# Patient Record
Sex: MALE | Race: OTHER | URBAN - METROPOLITAN AREA
[De-identification: names, ages, dates, MRNs, and addresses within clinical notes are randomized per-mention and may not be internally consistent; named-entity substitution may affect disease eponyms.]

---

## 2022-03-26 ENCOUNTER — INPATIENT (INPATIENT)
Facility: HOSPITAL | Age: 42
LOS: 2 days | Discharge: HOME | DRG: 897 | End: 2022-03-29
Attending: PSYCHIATRY & NEUROLOGY | Admitting: PSYCHIATRY & NEUROLOGY
Payer: MEDICAID

## 2022-03-26 VITALS
TEMPERATURE: 98 F | DIASTOLIC BLOOD PRESSURE: 61 MMHG | HEART RATE: 84 BPM | RESPIRATION RATE: 20 BRPM | OXYGEN SATURATION: 95 % | SYSTOLIC BLOOD PRESSURE: 127 MMHG

## 2022-03-26 DIAGNOSIS — F39 UNSPECIFIED MOOD [AFFECTIVE] DISORDER: ICD-10-CM

## 2022-03-26 DIAGNOSIS — R45.851 SUICIDAL IDEATIONS: ICD-10-CM

## 2022-03-26 DIAGNOSIS — F17.210 NICOTINE DEPENDENCE, CIGARETTES, UNCOMPLICATED: ICD-10-CM

## 2022-03-26 DIAGNOSIS — F25.9 SCHIZOAFFECTIVE DISORDER, UNSPECIFIED: ICD-10-CM

## 2022-03-26 DIAGNOSIS — G62.9 POLYNEUROPATHY, UNSPECIFIED: ICD-10-CM

## 2022-03-26 DIAGNOSIS — F14.14 COCAINE ABUSE WITH COCAINE-INDUCED MOOD DISORDER: ICD-10-CM

## 2022-03-26 DIAGNOSIS — F41.1 GENERALIZED ANXIETY DISORDER: ICD-10-CM

## 2022-03-26 DIAGNOSIS — Z59.00 HOMELESSNESS UNSPECIFIED: ICD-10-CM

## 2022-03-26 DIAGNOSIS — F14.929 COCAINE USE, UNSPECIFIED WITH INTOXICATION, UNSPECIFIED: ICD-10-CM

## 2022-03-26 DIAGNOSIS — Z91.51 PERSONAL HISTORY OF SUICIDAL BEHAVIOR: ICD-10-CM

## 2022-03-26 DIAGNOSIS — F12.20 CANNABIS DEPENDENCE, UNCOMPLICATED: ICD-10-CM

## 2022-03-26 DIAGNOSIS — B20 HUMAN IMMUNODEFICIENCY VIRUS [HIV] DISEASE: ICD-10-CM

## 2022-03-26 DIAGNOSIS — F43.23 ADJUSTMENT DISORDER WITH MIXED ANXIETY AND DEPRESSED MOOD: ICD-10-CM

## 2022-03-26 DIAGNOSIS — F14.129 COCAINE ABUSE WITH INTOXICATION, UNSPECIFIED: ICD-10-CM

## 2022-03-26 LAB
ALBUMIN SERPL ELPH-MCNC: 3 G/DL — LOW (ref 3.4–5)
ALP SERPL-CCNC: 62 U/L — SIGNIFICANT CHANGE UP (ref 40–120)
ALT FLD-CCNC: 21 U/L — SIGNIFICANT CHANGE UP (ref 12–42)
AMPHET UR-MCNC: NEGATIVE — SIGNIFICANT CHANGE UP
ANION GAP SERPL CALC-SCNC: 6 MMOL/L — LOW (ref 9–16)
AST SERPL-CCNC: 16 U/L — SIGNIFICANT CHANGE UP (ref 15–37)
BARBITURATES UR SCN-MCNC: NEGATIVE — SIGNIFICANT CHANGE UP
BASOPHILS # BLD AUTO: 0.04 K/UL — SIGNIFICANT CHANGE UP (ref 0–0.2)
BASOPHILS NFR BLD AUTO: 0.4 % — SIGNIFICANT CHANGE UP (ref 0–2)
BENZODIAZ UR-MCNC: NEGATIVE — SIGNIFICANT CHANGE UP
BILIRUB SERPL-MCNC: 0.3 MG/DL — SIGNIFICANT CHANGE UP (ref 0.2–1.2)
BUN SERPL-MCNC: 16 MG/DL — SIGNIFICANT CHANGE UP (ref 7–23)
CALCIUM SERPL-MCNC: 8.8 MG/DL — SIGNIFICANT CHANGE UP (ref 8.5–10.5)
CHLORIDE SERPL-SCNC: 104 MMOL/L — SIGNIFICANT CHANGE UP (ref 96–108)
CO2 SERPL-SCNC: 26 MMOL/L — SIGNIFICANT CHANGE UP (ref 22–31)
COCAINE METAB.OTHER UR-MCNC: POSITIVE
CREAT SERPL-MCNC: 0.86 MG/DL — SIGNIFICANT CHANGE UP (ref 0.5–1.3)
EGFR: 110 ML/MIN/1.73M2 — SIGNIFICANT CHANGE UP
EOSINOPHIL # BLD AUTO: 0.07 K/UL — SIGNIFICANT CHANGE UP (ref 0–0.5)
EOSINOPHIL NFR BLD AUTO: 0.6 % — SIGNIFICANT CHANGE UP (ref 0–6)
ETHANOL SERPL-MCNC: <3 MG/DL — SIGNIFICANT CHANGE UP
GLUCOSE SERPL-MCNC: 117 MG/DL — HIGH (ref 70–99)
HCT VFR BLD CALC: 40.4 % — SIGNIFICANT CHANGE UP (ref 39–50)
HGB BLD-MCNC: 13.7 G/DL — SIGNIFICANT CHANGE UP (ref 13–17)
IMM GRANULOCYTES NFR BLD AUTO: 0.4 % — SIGNIFICANT CHANGE UP (ref 0–1.5)
LYMPHOCYTES # BLD AUTO: 2.57 K/UL — SIGNIFICANT CHANGE UP (ref 1–3.3)
LYMPHOCYTES # BLD AUTO: 23.1 % — SIGNIFICANT CHANGE UP (ref 13–44)
MCHC RBC-ENTMCNC: 33.6 PG — SIGNIFICANT CHANGE UP (ref 27–34)
MCHC RBC-ENTMCNC: 33.9 GM/DL — SIGNIFICANT CHANGE UP (ref 32–36)
MCV RBC AUTO: 99 FL — SIGNIFICANT CHANGE UP (ref 80–100)
METHADONE UR-MCNC: NEGATIVE — SIGNIFICANT CHANGE UP
MONOCYTES # BLD AUTO: 0.72 K/UL — SIGNIFICANT CHANGE UP (ref 0–0.9)
MONOCYTES NFR BLD AUTO: 6.5 % — SIGNIFICANT CHANGE UP (ref 2–14)
NEUTROPHILS # BLD AUTO: 7.68 K/UL — HIGH (ref 1.8–7.4)
NEUTROPHILS NFR BLD AUTO: 69 % — SIGNIFICANT CHANGE UP (ref 43–77)
NRBC # BLD: 0 /100 WBCS — SIGNIFICANT CHANGE UP (ref 0–0)
OPIATES UR-MCNC: NEGATIVE — SIGNIFICANT CHANGE UP
PCP SPEC-MCNC: SIGNIFICANT CHANGE UP
PCP UR-MCNC: NEGATIVE — SIGNIFICANT CHANGE UP
PLATELET # BLD AUTO: 371 K/UL — SIGNIFICANT CHANGE UP (ref 150–400)
POTASSIUM SERPL-MCNC: 3.5 MMOL/L — SIGNIFICANT CHANGE UP (ref 3.5–5.3)
POTASSIUM SERPL-SCNC: 3.5 MMOL/L — SIGNIFICANT CHANGE UP (ref 3.5–5.3)
PROT SERPL-MCNC: 7.5 G/DL — SIGNIFICANT CHANGE UP (ref 6.4–8.2)
RBC # BLD: 4.08 M/UL — LOW (ref 4.2–5.8)
RBC # FLD: 13 % — SIGNIFICANT CHANGE UP (ref 10.3–14.5)
SARS-COV-2 RNA SPEC QL NAA+PROBE: SIGNIFICANT CHANGE UP
SODIUM SERPL-SCNC: 136 MMOL/L — SIGNIFICANT CHANGE UP (ref 132–145)
THC UR QL: POSITIVE
WBC # BLD: 11.12 K/UL — HIGH (ref 3.8–10.5)
WBC # FLD AUTO: 11.12 K/UL — HIGH (ref 3.8–10.5)

## 2022-03-26 PROCEDURE — 99234 HOSP IP/OBS SM DT SF/LOW 45: CPT

## 2022-03-26 PROCEDURE — 93010 ELECTROCARDIOGRAM REPORT: CPT

## 2022-03-26 RX ORDER — OLANZAPINE 15 MG/1
5 TABLET, FILM COATED ORAL EVERY 6 HOURS
Refills: 0 | Status: DISCONTINUED | OUTPATIENT
Start: 2022-03-27 | End: 2022-03-29

## 2022-03-26 RX ORDER — SERTRALINE 25 MG/1
50 TABLET, FILM COATED ORAL DAILY
Refills: 0 | Status: DISCONTINUED | OUTPATIENT
Start: 2022-03-27 | End: 2022-03-29

## 2022-03-26 RX ORDER — GABAPENTIN 400 MG/1
300 CAPSULE ORAL
Refills: 0 | Status: DISCONTINUED | OUTPATIENT
Start: 2022-03-27 | End: 2022-03-29

## 2022-03-26 RX ORDER — IBUPROFEN 200 MG
400 TABLET ORAL EVERY 8 HOURS
Refills: 0 | Status: DISCONTINUED | OUTPATIENT
Start: 2022-03-27 | End: 2022-03-29

## 2022-03-26 RX ORDER — OLANZAPINE 15 MG/1
5 TABLET, FILM COATED ORAL ONCE
Refills: 0 | Status: COMPLETED | OUTPATIENT
Start: 2022-03-26 | End: 2022-03-26

## 2022-03-26 RX ORDER — ARIPIPRAZOLE 15 MG/1
5 TABLET ORAL DAILY
Refills: 0 | Status: DISCONTINUED | OUTPATIENT
Start: 2022-03-27 | End: 2022-03-29

## 2022-03-26 RX ORDER — ACETAMINOPHEN 500 MG
650 TABLET ORAL ONCE
Refills: 0 | Status: COMPLETED | OUTPATIENT
Start: 2022-03-26 | End: 2022-03-26

## 2022-03-26 RX ORDER — BICTEGRAVIR SODIUM, EMTRICITABINE, AND TENOFOVIR ALAFENAMIDE FUMARATE 30; 120; 15 MG/1; MG/1; MG/1
1 TABLET ORAL DAILY
Refills: 0 | Status: DISCONTINUED | OUTPATIENT
Start: 2022-03-27 | End: 2022-03-29

## 2022-03-26 RX ADMIN — Medication 650 MILLIGRAM(S): at 11:38

## 2022-03-26 RX ADMIN — OLANZAPINE 5 MILLIGRAM(S): 15 TABLET, FILM COATED ORAL at 11:35

## 2022-03-26 RX ADMIN — OLANZAPINE 5 MILLIGRAM(S): 15 TABLET, FILM COATED ORAL at 19:58

## 2022-03-26 SDOH — ECONOMIC STABILITY - HOUSING INSECURITY: HOMELESSNESS UNSPECIFIED: Z59.00

## 2022-03-26 NOTE — ED ADULT NURSE NOTE - NSIMPLEMENTINTERV_GEN_ALL_ED
Implemented All Fall Risk Interventions:  [place] to call system. Call bell, personal items and telephone within reach. Instruct patient to call for assistance. Room bathroom lighting operational. Non-slip footwear when patient is off stretcher. Physically safe environment: no spills, clutter or unnecessary equipment. Stretcher in lowest position, wheels locked, appropriate side rails in place. Provide visual cue, wrist band, yellow gown, etc. Monitor gait and stability. Monitor for mental status changes and reorient to person, place, and time. Review medications for side effects contributing to fall risk. Reinforce activity limits and safety measures with patient and family.

## 2022-03-26 NOTE — ED BEHAVIORAL HEALTH NOTE - BEHAVIORAL HEALTH NOTE
REASSESSMENT:    Angel was given olanzapine 5mg IM for agitation at 11:35am and slept through the day. He was reassessed around 5:30pm. On subsequent exam, he presented with calmer affect and less hostility. He reported improvement in mood, though still had continued suicidal ideation, and stated that if he left he would kill himself. He reiterated that he had attempted to kill himself on the night of 3/25/22 with a razor to his wrist. He showed me a scar on his L wrist from a past suicide attempt by cutting from ~20y ago. He expressed desperation regarding the loss of his mother, his lack of social supports, his lack of access to care and general hopelessness about his life. He reported AH of voices telling him to kill himself. He reported he had used cocaine about 3 days ago. He expressed a desire to sign into the hospital.    More history collected: Angel identified his diagnosis as Schizoaffective Disorder, and he stated when he was symptomatic he experienced AH and depressed mood. He reported having been on Abilify, Zoloft, and Xanax 6mo ago but stopped when his mother . He added that he was taking biktarvy for HIV and gabapentin for neuropathy as well, but had stopped. He estimated he had been psychiatrically hospitalized 3-4x in his life, last time ~3y ago in [place]. He stated that since coming to [place], he had been street homeless.     ASSESSMENT/PLAN:    Angel is a 40yo man (homeless, recently moved to [facility] from [facility]) with reported PPHx of Bipolar/Schizoaffective DO (hx of psych admissions in [place], not currently in treatment x6mo, limited other details) and PMHx of HIV. He self-presented to ED reporting suicidal ideation, related to bereavement of his mother 6mo ago. He initially presented with labile mood and agitation, requiring IM medication. Impression was acute intoxication from cocaine. After being medicated and sleeping several hours, he was re-assessed. On subsequent exam, he continued to endorse suicidal ideation related to multiple life stressors, lack of social support and lack of access to care. Given the above factors and Angel's history of suicide attempt, he is at an acutely elevated risk and requires hospitalization for safety and stabilization. He agrees to sign in on  status.    - Psych: restart aripiprazole 5mg, sertraline 50mg; PRN olanzapine  - Medical: restart biktarvy, gabapentin for neuropathy  - Safety: routine checks, no need for 1:1  - Legal:  REASSESSMENT:    Angel was given olanzapine 5mg IM for agitation at 11:35am and slept through the day. He was reassessed around 5:30pm. On subsequent exam, he presented with calmer affect and less hostility. He reported improvement in mood, though still had continued suicidal ideation, and stated that if he left he would kill himself. He reiterated that he had attempted to kill himself on the night of 3/25/22 with a razor to his wrist. He showed me a scar on his L wrist from a past suicide attempt by cutting from ~20y ago. He expressed desperation regarding the loss of his mother, his lack of social supports, his lack of access to care and general hopelessness about his life. He reported AH of voices telling him to kill himself. He reported he had used cocaine about 3 days ago. He expressed a desire to sign into the hospital.    More history collected: Angel identified his diagnosis as Schizoaffective Disorder, and he stated when he was symptomatic he experienced AH and depressed mood. He reported having been on Abilify, Zoloft, and Xanax 6mo ago but stopped when his mother . He added that he was taking biktarvy for HIV and gabapentin for neuropathy as well, but had stopped. He estimated he had been psychiatrically hospitalized 3-4x in his life, last time ~3y ago in [place]. He stated that since coming to [place], he had been street homeless.     ASSESSMENT/PLAN:    Angel is a 42yo man (homeless, recently moved to [facility] from [facility]) with reported PPHx of Bipolar/Schizoaffective DO (hx of psych admissions in [place], not currently in treatment x6mo, limited other details) and PMHx of HIV. He self-presented to ED reporting suicidal ideation, related to bereavement of his mother 6mo ago. He initially presented with labile mood and agitation, requiring IM medication. Impression was acute intoxication from cocaine. After being medicated and sleeping several hours, he was re-assessed. On subsequent exam, he continued to endorse suicidal ideation related to multiple life stressors, lack of social support and lack of access to care. Given the above factors and Angel's history of suicide attempt, he is at an acutely elevated risk and requires hospitalization for safety and stabilization. He agrees to sign in on  status.    - Psych: restart aripiprazole 5mg, sertraline 50mg; PRN olanzapine  - Medical: restart biktarvy, gabapentin for neuropathy  - Safety: routine checks, no need for 1:1  - Legal:     COVID Exposure Screen- Patient    1. *Have you had a COVID-19 test in the last 90 days? ( ) Yes (x) No ( ) Unknown- Reason: _____  IF YES PROCEED TO QUESTION #2. IF NO OR UNKNOWN, PLEASE SKIP TO QUESTION #3.  2. Date of test(s) and result(s): ________  3. *Have you tested positive for COVID-19 antibodies? ( ) Yes (x) No ( ) Unknown- Reason: _____  IF YES PROCEED TO QUESTION #4. IF NO or UNKNOWN, PLEASE SKIP TO QUESTION #5.  4. Date of positive antibody test: ________  5. *Have you received 2 doses of the COVID-19 vaccine? (x) Yes ( ) No ( ) Unknown- Reason: _____  IF YES PROCEED TO QUESTION #6. IF NO or UNKNOWN, PLEASE SKIP TO QUESTION #7.  6. Date of second dose: "months ago"  7. *In the past 10 days, have you been around anyone with a positive COVID-19 test?* ( ) Yes (x) No ( ) Unknown- Reason: ____  IF YES PROCEED TO QUESTION #8. IF NO or UNKNOWN, PLEASE SKIP TO QUESTION #13.  8. Were you within 6 feet of them for at least 15 minutes? ( ) Yes ( ) No ( ) Unknown- Reason: _____  9. Have you provided care for them? ( ) Yes ( ) No ( ) Unknown- Reason: ______  10. Have you had direct physical contact with them (touched, hugged, or kissed them)? ( ) Yes ( ) No ( ) Unknown- Reason: _____  11. Have you shared eating or drinking utensils with them? ( ) Yes ( ) No ( ) Unknown- Reason: ____  12. Have they sneezed, coughed, or somehow gotten respiratory droplets on you? ( ) Yes ( ) No ( ) Unknown- Reason: ______  13. *Have you been out of [place] within the past 10 days?* ( ) Yes (x) No ( ) Unknown- Reason: _____  IF YES PLEASE ANSWER THE FOLLOWING QUESTIONS:  14. Which state/country have you been to? ______  15. Were you there over 24 hours? ( ) Yes ( ) No ( ) Unknown- Reason: ______  16. Date of return to [facility]: ______

## 2022-03-26 NOTE — ED BEHAVIORAL HEALTH ASSESSMENT NOTE - DETAILS
Spoke to Dr Cancino Described standing with razor to wrist on 3/25/22, with suicidal intent Self-referred

## 2022-03-26 NOTE — ED BEHAVIORAL HEALTH ASSESSMENT NOTE - SUMMARY
Angel is a 42yo man (homeless, recently moved to [facility] from [facility]) with reported PPHx of Bipolar/Schizoaffective DO (hx of psych admissions in [place], not currently in treatment x6mo, limited other details) and PMHx of HIV. He self-presented to ED reporting suicidal ideation, related to bereavement of his mother 6mo ago.    Angel was interviewed via telepsych by myself and PA student Ulises Blair. On 1:1. He was agitated on encounter, with labile affect and general hostility towards team. He appeared to have freshly dyed his hair pink, and residue was visible on his hands/sheets of ED bed. He initially stated he was in the ED because "I'm having suicidal thoughts" and described how last night he had stood on a rooftop with a razor against his wrist, wanting to cut himself. He was not sure what stopped him, but he then came to the ED to seek help. He reported he was previously in treatment (trials of Buspar, Prozac, Xanax) but stopped 6 months ago after his mother . He reported his mother was his main support and helped him stay in psychiatric treatment. 5 minutes into the interview, he became dysregulated and screamed at Ms Johnnie - "Are you going to help me or not??? That's it, I'm leaving, no one is helping me." He was not verbally redirectable via telepsych cart. He stated he was no longer suicidal, but then said "I'm going to leave here and go to sleep" without elaborating.    Case discussed with ED team - presentation erratic, consistent with cocaine or other substance intoxication vs carrie. Unable to meaningfully safety plan or clarify symptoms to dispo at this time. Mr Vasquez will receive olanzapine 5mg IM and be placed on OBS status for reassessment later in the day.

## 2022-03-26 NOTE — ED ADULT NURSE REASSESSMENT NOTE - NS ED NURSE REASSESS COMMENT FT1
Pt received from Wilber RN. Pt resting comfortably in bed. No s/s of acute distress. Will continue to monitor

## 2022-03-26 NOTE — ED BEHAVIORAL HEALTH ASSESSMENT NOTE - MEDICAL ISSUES AND PLAN (INCLUDE STANDING AND PRN MEDICATION)
Refill requests received for  tiotropium (SPIRIVA HANDIHALER) 18 MCG capsule for inhaler 30 capsule 11 6/11/2018     Sig - Route: Place 1 capsule into inhaler and inhale daily. - Inhalation      fluticasone-vilanterol (BREO ELLIPTA) 200-25 MCG/INH inhaler 60 each 11 6/11/2018     Sig - Route: Inhale 1 puff into the lungs once daily. - Inhalation      Last office visit 5/22/19 and patient to continue spiriva and breo ellipta for COPD.  Future office visit not scheduled    Refills sent per MD standing orders.  
None

## 2022-03-26 NOTE — ED BEHAVIORAL HEALTH ASSESSMENT NOTE - HPI (INCLUDE ILLNESS QUALITY, SEVERITY, DURATION, TIMING, CONTEXT, MODIFYING FACTORS, ASSOCIATED SIGNS AND SYMPTOMS)
Angel is a 42yo man (homeless, recently moved to [facility] from [facility]) with reported PPHx of Bipolar/Schizoaffective DO (hx of psych admissions in [place], not currently in treatment x6mo, limited other details) and PMHx of HIV. He self-presented to ED reporting suicidal ideation, related to bereavement of his mother 6mo ago.    Angel was interviewed via telepsych by myself and PA student Ulises Blair. On 1:1. He was agitated on encounter, with labile affect and general hostility towards team. He appeared to have freshly dyed his hair pink, and residue was visible on his hands/sheets of ED bed. He initially stated he was in the ED because "I'm having suicidal thoughts" and described how last night he had stood on a rooftop with a razor against his wrist, wanting to cut himself. He was not sure what stopped him, but he then came to the ED to seek help. He reported he was previously in treatment (trials of Buspar, Prozac, Xanax) but stopped 6 months ago after his mother . He reported his mother was his main support and helped him stay in psychiatric treatment. 5 minutes into the interview, he became dysregulated and screamed at Ms Johnnie - "Are you going to help me or not??? That's it, I'm leaving, no one is helping me." He was not verbally redirectable via telepsych cart. He stated he was no longer suicidal, but then said "I'm going to leave here and go to sleep" without elaborating.    Case discussed with ED team - presentation erratic, consistent with cocaine or other substance intoxication vs carrie. Unable to meaningfully safety plan or clarify symptoms to dispo at this time. Mr Vasquez will receive olanzapine 5mg IM and be placed on OBS status for reassessment later in the day.

## 2022-03-26 NOTE — ED BEHAVIORAL HEALTH ASSESSMENT NOTE - DESCRIPTION
Initially calm but agitated once interviewed by telepsych, given Zyprexa IM    Vital Signs - Last 24 Hrs    T(C): 36.4 (03-26-22 @ 09:22), Max: 36.6 (03-26-22 @ 05:45)  HR: 78 (03-26-22 @ 09:22) (78 - 84)  BP: 136/87 (03-26-22 @ 09:22) (127/61 - 138/89)  RR: 18 (03-26-22 @ 09:22) (18 - 20)  SpO2: 98% (03-26-22 @ 09:22) (95% - 98%)  Wt(kg): --  Daily     Daily     I&O's Summary HIV Currently homeless, apparently recently moved to [facility] from [facility] 6months ago after mother

## 2022-03-26 NOTE — ED PROVIDER NOTE - CLINICAL SUMMARY MEDICAL DECISION MAKING FREE TEXT BOX
suicidal ideation, called psych dr arellano, labs OK, etoh neg, will be evaluated by AM psyc team. Will sign out to AM to call for consult.

## 2022-03-26 NOTE — ED BEHAVIORAL HEALTH ASSESSMENT NOTE - NSPRESENTSXS_PSY_ALL_CORE
Depressed mood/Anhedonia/Impulsivity/Hopelessness or despair/Refusal or inability to complete safety plan

## 2022-03-26 NOTE — ED BEHAVIORAL HEALTH NOTE - BEHAVIORAL HEALTH NOTE
Telepsychiatry Note:    MD received handoff on patient. Patient seen via [employer] cart. Patient presents somewhat drowsy, depressed. Has suicidal ideation. He remains in agreement with voluntary admission to [facility].     - transfer to [facility] on 9.13 status for inpatient psychiatric mgmt.

## 2022-03-26 NOTE — ED ADULT NURSE REASSESSMENT NOTE - NS ED NURSE REASSESS COMMENT FT1
Pt sleeping comfortably in bed. No s/s of acute distress. Will continue to monitor. 1:1 constant observation maintained

## 2022-03-26 NOTE — ED ADULT TRIAGE NOTE - CHIEF COMPLAINT QUOTE
Pt reports SI w/ thoughts of cutting self, endorses hearing their mother tell them to "join her", pt states their mother is dead, also reports seeing "black shadows". Denies HI. Pt endorses hx of bipolar schizophrenia, anxiety and depression. Pt states they have been "living on the streets" and have not had any of their medication x6 months.

## 2022-03-26 NOTE — ED PROVIDER NOTE - OBJECTIVE STATEMENT
40 yo male with hx bipolar d/o schizoaffective, HIV, stopped all his meds approx 6 months ago when he moved from [place] to [facility]. Currently homeless for approx 6 months since his mom . Came to ED stating he has no good reason to live anymore, is depressed ad stopped taking his medications due to depression, he has increasing frequency of suicidal thoughts, has been hospitalized in [place] for psych treatment.

## 2022-03-26 NOTE — ED ADULT NURSE NOTE - OBJECTIVE STATEMENT
Pt reports SI w/ thoughts of cutting self, pt presents with red hair dye in hair and all over hands, states this was her mothers hair color and shes been hearing to  "join her", pt states their mother is dead, also reports seeing "black shadows". Denies HI. Pt endorses hx of bipolar schizophrenia, anxiety and depression. Pt states they have been "living on the streets" and have not had any of their medication x6 months.

## 2022-03-26 NOTE — ED ADULT NURSE NOTE - ACTIVATING EVENTS/STRESSORS
Pending incarceration or homelessness/Non-compliant or not receiving treatment/Inadequate social supports

## 2022-03-26 NOTE — ED ADULT NURSE REASSESSMENT NOTE - NS ED NURSE REASSESS COMMENT FT1
Transfer of care acknowledged with bedside rounding, lab results reviewed, will continue to monitor.  pt remains on 1:1 for SI. in nad at this time, sleeping comfortably

## 2022-03-27 DIAGNOSIS — F14.10 COCAINE ABUSE, UNCOMPLICATED: ICD-10-CM

## 2022-03-27 DIAGNOSIS — F12.20 CANNABIS DEPENDENCE, UNCOMPLICATED: ICD-10-CM

## 2022-03-27 RX ADMIN — Medication 400 MILLIGRAM(S): at 10:34

## 2022-03-27 RX ADMIN — SERTRALINE 50 MILLIGRAM(S): 25 TABLET, FILM COATED ORAL at 10:34

## 2022-03-27 RX ADMIN — Medication 400 MILLIGRAM(S): at 11:30

## 2022-03-27 RX ADMIN — BICTEGRAVIR SODIUM, EMTRICITABINE, AND TENOFOVIR ALAFENAMIDE FUMARATE 1 TABLET(S): 30; 120; 15 TABLET ORAL at 10:34

## 2022-03-27 RX ADMIN — GABAPENTIN 300 MILLIGRAM(S): 400 CAPSULE ORAL at 10:34

## 2022-03-27 RX ADMIN — OLANZAPINE 5 MILLIGRAM(S): 15 TABLET, FILM COATED ORAL at 16:06

## 2022-03-27 RX ADMIN — ARIPIPRAZOLE 5 MILLIGRAM(S): 15 TABLET ORAL at 10:33

## 2022-03-27 RX ADMIN — GABAPENTIN 300 MILLIGRAM(S): 400 CAPSULE ORAL at 22:13

## 2022-03-27 NOTE — BH INPATIENT PSYCHIATRY ASSESSMENT NOTE - NSBHMETABOLIC_PSY_ALL_CORE_FT
BMI: BMI (kg/m2): 19.3 (03-27-22 @ 01:48)  HbA1c:   Glucose:   BP: 137/89 (03-27-22 @ 09:09) (127/61 - 143/79)  Lipid Panel:

## 2022-03-27 NOTE — BH INPATIENT PSYCHIATRY ASSESSMENT NOTE - NSCOMMENTSUICRISKFACT_PSY_ALL_CORE
The patient has a chronic risk of suicide/self-harm given a history of suicide attempts, history of mood disorder and psychosis, ongoing substance use, male gender, history of trauma, and recent acute stressor (loss of mother).

## 2022-03-27 NOTE — BH INPATIENT PSYCHIATRY ASSESSMENT NOTE - NSBHCHARTREVIEWLAB_PSY_A_CORE FT
CBC (03/26/22): WBC elevated 11.12, RBC low at 4.08, Neutrophil elevated to 7.68  CMP (03/26/22): Serum Glucose elevated 117, Serum Albumin low at 3.0, Serum Anion Gap low at 6  UTox (03/26/22): Positive for THC and Cocaine  COVID-19 PCR: Not detected

## 2022-03-27 NOTE — ED BEHAVIORAL HEALTH NOTE - BEHAVIORAL HEALTH NOTE
Patient interviewed and part B completed at [facility]. Orders have been entered and handoff provided to inpatient RN  and Inpatient MD will receive handoff in AM. Full ED BH Assessment is located in sunrise.

## 2022-03-27 NOTE — BH INPATIENT PSYCHIATRY ASSESSMENT NOTE - CURRENT MEDICATION
MEDICATIONS  (STANDING):  ARIPiprazole 5 milliGRAM(s) Oral daily  bictegravir 50 mG/emtricitabine 200 mG/tenofovir alafenamide 25 mG (BIKTARVY) 1 Tablet(s) Oral daily  gabapentin 300 milliGRAM(s) Oral two times a day  sertraline 50 milliGRAM(s) Oral daily    MEDICATIONS  (PRN):  ibuprofen  Tablet. 400 milliGRAM(s) Oral every 8 hours PRN Moderate Pain (4 - 6)  OLANZapine 5 milliGRAM(s) Oral every 6 hours PRN agitation

## 2022-03-27 NOTE — BH INPATIENT PSYCHIATRY ASSESSMENT NOTE - DESCRIPTION (FIRST USE, LAST USE, QUANTITY, FREQUENCY, DURATION)
The patient first smoked tobacco at the age of 6 years old.  The patient states that he smokes "occasionally" and that his last cigarette was a "few days ago."
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
## 2022-03-27 NOTE — BH INPATIENT PSYCHIATRY ASSESSMENT NOTE - NSBHCHARTREVIEWVS_PSY_A_CORE FT
DISPLAY PLAN FREE TEXT Vital Signs Last 24 Hrs  T(C): 37.1 (03-27-22 @ 09:09), Max: 37.1 (03-27-22 @ 09:09)  T(F): 98.8 (03-27-22 @ 09:09), Max: 98.8 (03-27-22 @ 09:09)  HR: 93 (03-27-22 @ 09:09) (77 - 94)  BP: 137/89 (03-27-22 @ 09:09) (132/93 - 143/79)  BP(mean): --  RR: 18 (03-27-22 @ 09:09) (16 - 18)  SpO2: 99% (03-27-22 @ 09:09) (97% - 99%)

## 2022-03-27 NOTE — BH PATIENT CHARACTERISTICS SURVEY - NSPCSHEALTHINS4_PSY_ALL_CORE
-- Message is from the [facility]--    Order Request  Lab: blood work    Message / reason: needs order for blood work and does the patient have to fast for the test ?    Insurance type: hmo  No billing information found for this encounter.           Preferred Delivery Method   Call when ready for pickup - phone number to notify: [phone]     Caller Information       Type Contact Phone    10/07/2019 03:54 PM Phone (Incoming) Victoriano Key (Self) [phone] (H)          Alternative phone number: none    Turnaround time given to caller:   \"This message will be sent to [state Provider's name]. The clinical team will fulfill your request as soon as they review your message when the office opens tomorrow.\"  
Lab has been ordered-He must be fasting  
Last labs 10-5-2018  
Notified pt by vociemail  
No
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
## 2022-03-27 NOTE — BH INPATIENT PSYCHIATRY ASSESSMENT NOTE - NSICDXBHSECONDARYDX_PSY_ALL_CORE
Schizoaffective disorder   F25.9  Cocaine intoxication   F14.929  Moderate cannabis use disorder   F12.20  Cocaine use disorder   F14.10

## 2022-03-27 NOTE — BH INPATIENT PSYCHIATRY ASSESSMENT NOTE - NSDCCRITERIA_PSY_ALL_CORE
Improvement in emotional regulation, resolution of SI, and improvement in anxious and depressive symptoms.  Establishment of outpatient psychiatric follow-up.

## 2022-03-27 NOTE — BH INPATIENT PSYCHIATRY ASSESSMENT NOTE - NSBHCONSBHPROVCNTCTNOFT_PSY_A_CORE
The patient is not currently in outpatient psychiatric treatment and he does not remember the name or contact information of his last psychiatrist.

## 2022-03-27 NOTE — BH PATIENT PROFILE - FALL HARM RISK - UNIVERSAL INTERVENTIONS
Bed in lowest position, wheels locked, appropriate side rails in place/Call bell, personal items and telephone in reach/Instruct patient to call for assistance before getting out of bed or chair/Non-slip footwear when patient is out of bed/Malcolm to call system/Physically safe environment - no spills, clutter or unnecessary equipment/Purposeful Proactive Rounding/Room/bathroom lighting operational, light cord in reach

## 2022-03-27 NOTE — BH INPATIENT PSYCHIATRY ASSESSMENT NOTE - NSCOMMENTSUICPROTRISKFACT_PSY_ALL_CORE
Protective factors include a capacity to advocate for self, future-motivated, willing to engage in treatment, euthymic affect and currently denying SI.

## 2022-03-27 NOTE — BH INPATIENT PSYCHIATRY ASSESSMENT NOTE - RISK ASSESSMENT
Mod: Presenting symptoms, lack of access to care  Unmod: Homelessness, drug use, SMI, limited social support  Protective: May not be suicidal    Elevated risk by history and current presentation, will hold in ED to reassess
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
## 2022-03-28 PROCEDURE — 99233 SBSQ HOSP IP/OBS HIGH 50: CPT

## 2022-03-28 RX ORDER — LIDOCAINE 4 G/100G
15 CREAM TOPICAL THREE TIMES A DAY
Refills: 0 | Status: DISCONTINUED | OUTPATIENT
Start: 2022-03-28 | End: 2022-03-29

## 2022-03-28 RX ADMIN — Medication 400 MILLIGRAM(S): at 10:41

## 2022-03-28 RX ADMIN — SERTRALINE 50 MILLIGRAM(S): 25 TABLET, FILM COATED ORAL at 10:42

## 2022-03-28 RX ADMIN — GABAPENTIN 300 MILLIGRAM(S): 400 CAPSULE ORAL at 10:42

## 2022-03-28 RX ADMIN — BICTEGRAVIR SODIUM, EMTRICITABINE, AND TENOFOVIR ALAFENAMIDE FUMARATE 1 TABLET(S): 30; 120; 15 TABLET ORAL at 10:43

## 2022-03-28 RX ADMIN — Medication 400 MILLIGRAM(S): at 19:27

## 2022-03-28 RX ADMIN — ARIPIPRAZOLE 5 MILLIGRAM(S): 15 TABLET ORAL at 10:42

## 2022-03-28 RX ADMIN — Medication 400 MILLIGRAM(S): at 12:42

## 2022-03-28 RX ADMIN — Medication 400 MILLIGRAM(S): at 19:47

## 2022-03-28 RX ADMIN — OLANZAPINE 5 MILLIGRAM(S): 15 TABLET, FILM COATED ORAL at 17:34
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
## 2022-03-28 NOTE — BH SOCIAL WORK INITIAL PSYCHOSOCIAL EVALUATION - NSBHHOUSE_PSY_ALL_CORE
Per chart review, the patient states that she has been "living on the streets for the past six months."/Shelter/Sober house Per chart review, the patient states that she has been "living on the streets for the past six months."/Home alone

## 2022-03-28 NOTE — BH INPATIENT PSYCHIATRY PROGRESS NOTE - CASE SUMMARY
Pt already requesting discharge. No suicidality at this time and downplaying symptoms. Presentation fits well with cocaine induced mood/psychotic disorder. Pt says she is returning to [place]. Feeling better on abilify. Said she would be open to MÉNDEZ abilify but unclear how much she really needs that. Likely discharge tomorrow.

## 2022-03-28 NOTE — BH INPATIENT PSYCHIATRY PROGRESS NOTE - NSTXCOPEINTERMD_PSY_ALL_CORE
Medication management 15 min x day; continue to develop therapeutic relationship and to encourage adherence with gabapentin, Zoloft, Abilify; consider Maintena before discharge

## 2022-03-28 NOTE — BH SOCIAL WORK INITIAL PSYCHOSOCIAL EVALUATION - SAFE PLACE TO LIVE - DETAILS
Per chart review, the patient states that she has been "living on the streets for the past six months."

## 2022-03-28 NOTE — BH SOCIAL WORK INITIAL PSYCHOSOCIAL EVALUATION - NSBHFINANCECOPAY_PSY_ALL_CORE
Per chart, the patient is listed as self-pay. [facility] [employer] has been notified./Unable to answer (specify) Per chart, the patient is listed as self-pay. [facility] [employer] has been notified./Unknown
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
## 2022-03-28 NOTE — BH INPATIENT PSYCHIATRY PROGRESS NOTE - NSBHFUPINTERVALHXFT_PSY_A_CORE
Pt seen in her room this morning with the team. She stated that she felt much better apart from headache and toothache (has a broken tooth). Pt stated that she suffers from migraines on occasion. She noted that her symptoms were much reduced today and that she would like to return home, first to her aunt's place here in [place], then back to [place]. She denied SI entirely, as well as AH or other psychotic symptoms. The patient stated that this had been the first time she ever tried cocaine, and that it made her feel much worse, so she would not want to do it again. She also said that her medications -- Abilify, Zoloft, gabapentin -- were what she had "always been on" and that they were helping her feel stable.  The patient credited scars on her forearms to "stress cutting" when she was younger and denied any suicide attempts in the past (other than the one that brought her here). She denied ever having self-harmed to the point where she needed serious medical attention. Denied use of other substances or of nicotine. 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 She noted a allergy to fish. Denied history of trauma (despite notes in chart).    Regarding discharge, the patient was advised that the team had just met her and would need at least a short while to get to know her and her situation/level of acuity before discharge, adding that follow-up appointments would have to be made. She appeared fine with this and noted that she would accept a follow-up appointment with psychiatry here in [place]. She also noted that psychotherapy had been helpful ("just generally") in the past and that she would not mind more of it.    The patient would be okay with discharge either with PO meds or on Maintena.

## 2022-03-28 NOTE — BH SOCIAL WORK INITIAL PSYCHOSOCIAL EVALUATION - NSBHTREATHX_PSY_ALL_CORE
Per chart review, not currently in outpatient psychiatric treatment (last in treatment approximately six months ago)./Unable to answer (specify)

## 2022-03-28 NOTE — BH SOCIAL WORK INITIAL PSYCHOSOCIAL EVALUATION - NSBHHOUSECOMMENTFT_PSY_ALL_CORE
The patient reports she will reside with her "aunt" at [address] [place] [address] upon discharge. Her aunt is named Bernadette Mcneill, but she did not know her aunt's phone number.

## 2022-03-28 NOTE — BH INPATIENT PSYCHIATRY PROGRESS NOTE - CURRENT MEDICATION
MEDICATIONS  (STANDING):  ARIPiprazole 5 milliGRAM(s) Oral daily  bictegravir 50 mG/emtricitabine 200 mG/tenofovir alafenamide 25 mG (BIKTARVY) 1 Tablet(s) Oral daily  gabapentin 300 milliGRAM(s) Oral two times a day  sertraline 50 milliGRAM(s) Oral daily    MEDICATIONS  (PRN):  ibuprofen  Tablet. 400 milliGRAM(s) Oral every 8 hours PRN Moderate Pain (4 - 6)  OLANZapine 5 milliGRAM(s) Oral every 6 hours PRN agitation   MEDICATIONS  (STANDING):    MEDICATIONS  (PRN):

## 2022-03-28 NOTE — BH INPATIENT PSYCHIATRY PROGRESS NOTE - NSBHMETABOLIC_PSY_ALL_CORE_FT
BMI: BMI (kg/m2): 19.3 (03-27-22 @ 01:48)  HbA1c:   Glucose:   BP: 135/91 (03-28-22 @ 08:45) (127/61 - 162/91)  Lipid Panel:  BMI: BMI (kg/m2): 19.3 (03-27-22 @ 01:48)  HbA1c: A1C with Estimated Average Glucose Result: 4.8 % (03-29-22 @ 07:55)    Glucose:   BP: 132/89 (03-29-22 @ 08:30) (132/89 - 162/91)  Lipid Panel: Date/Time: 03-29-22 @ 07:55  Cholesterol, Serum: 135  Direct LDL: --  HDL Cholesterol, Serum: 41  Total Cholesterol/HDL Ration Measurement: --  Triglycerides, Serum: 57

## 2022-03-28 NOTE — BH INPATIENT PSYCHIATRY PROGRESS NOTE - PRN MEDS
MEDICATIONS  (PRN):  ibuprofen  Tablet. 400 milliGRAM(s) Oral every 8 hours PRN Moderate Pain (4 - 6)  OLANZapine 5 milliGRAM(s) Oral every 6 hours PRN agitation   MEDICATIONS  (PRN):

## 2022-03-28 NOTE — BH SOCIAL WORK INITIAL PSYCHOSOCIAL EVALUATION - NSPROGENSOURCEINFO_PSY_ALL_CORE
The patient stated she had a headache and asked if this writer could stop by at a later time. Thus, this assessment is completed via mainly chart review until the patient is available to speak in further depth./Patient/Transcribed from patient self report Patient/Transcribed from patient self report

## 2022-03-28 NOTE — BH INPATIENT PSYCHIATRY PROGRESS NOTE - NSBHCHARTREVIEWVS_PSY_A_CORE FT
Vital Signs Last 24 Hrs  T(C): 36.7 (03-28-22 @ 08:45), Max: 37.1 (03-27-22 @ 21:00)  T(F): 98.1 (03-28-22 @ 08:45), Max: 98.8 (03-27-22 @ 21:00)  HR: 100 (03-28-22 @ 08:45) (86 - 100)  BP: 135/91 (03-28-22 @ 08:45) (135/91 - 162/91)  BP(mean): --  RR: 18 (03-28-22 @ 08:45) (18 - 18)  SpO2: 97% (03-28-22 @ 08:45) (96% - 97%)     Vital Signs Last 24 Hrs  T(C): --  T(F): --  HR: --  BP: --  BP(mean): --  RR: --  SpO2: --

## 2022-03-28 NOTE — BH SOCIAL WORK INITIAL PSYCHOSOCIAL EVALUATION - OTHER PAST PSYCHIATRIC HISTORY (INCLUDE DETAILS REGARDING ONSET, COURSE OF ILLNESS, INPATIENT/OUTPATIENT TREATMENT)
Per chart review, PPH reported Bipolar vs Schizoaffective Disorder, history of prior inpatient psychiatric admissions in [place] most recently three years ago, not currently in outpatient psychiatric treatment (last in treatment approximately six months ago).

## 2022-03-28 NOTE — BH SOCIAL WORK INITIAL PSYCHOSOCIAL EVALUATION - NSBHFINANCEBENEFIT_PSY_ALL_CORE
Per chart, the patient is listed as self-pay. [facility] [employer] has been notified./Unable to answer (specify) Per chart, the patient is listed as self-pay. [facility] [employer] has been notified./Unknown

## 2022-03-28 NOTE — BH SOCIAL WORK INITIAL PSYCHOSOCIAL EVALUATION - NSBHFINANCE_PSY_ALL_CORE
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 However, per chart review, patient reported that she is currently unemployed and that her mother supported her financially until her death in September 2021./Earned income
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
## 2022-03-28 NOTE — BH TREATMENT PLAN - NSPTSTATEDGOAL_PSY_ALL_CORE
The patient stated she had a headache and asked if this writer could stop by at a later time. Thus, this assessment is completed via mainly chart review until the patient is available to speak in further depth.

## 2022-03-28 NOTE — BH SOCIAL WORK INITIAL PSYCHOSOCIAL EVALUATION - NSPTSTATEDGOAL_PSY_ALL_CORE
The patient stated she had a headache and asked if this writer could stop by at a later time. Thus, this assessment is completed via mainly chart review until the patient is available to speak in further depth.  The patient stated that she is discharge focused, and would like to live with her aunt post-hospital discharge. She was open to an outpatient mental health referral upon discharge.
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
## 2022-03-29 VITALS
HEART RATE: 98 BPM | RESPIRATION RATE: 18 BRPM | DIASTOLIC BLOOD PRESSURE: 89 MMHG | TEMPERATURE: 98 F | SYSTOLIC BLOOD PRESSURE: 132 MMHG | OXYGEN SATURATION: 98 %

## 2022-03-29 LAB
A1C WITH ESTIMATED AVERAGE GLUCOSE RESULT: 4.8 % — SIGNIFICANT CHANGE UP (ref 4–5.6)
CHOLEST SERPL-MCNC: 135 MG/DL — SIGNIFICANT CHANGE UP
ESTIMATED AVERAGE GLUCOSE: 91 MG/DL — SIGNIFICANT CHANGE UP (ref 68–114)
HDLC SERPL-MCNC: 41 MG/DL — SIGNIFICANT CHANGE UP
LIPID PNL WITH DIRECT LDL SERPL: 83 MG/DL — SIGNIFICANT CHANGE UP
NON HDL CHOLESTEROL: 94 MG/DL — SIGNIFICANT CHANGE UP
TRIGL SERPL-MCNC: 57 MG/DL — SIGNIFICANT CHANGE UP

## 2022-03-29 PROCEDURE — 87635 SARS-COV-2 COVID-19 AMP PRB: CPT

## 2022-03-29 PROCEDURE — 99239 HOSP IP/OBS DSCHRG MGMT >30: CPT

## 2022-03-29 PROCEDURE — 96372 THER/PROPH/DIAG INJ SC/IM: CPT

## 2022-03-29 PROCEDURE — 80307 DRUG TEST PRSMV CHEM ANLYZR: CPT

## 2022-03-29 PROCEDURE — 36415 COLL VENOUS BLD VENIPUNCTURE: CPT

## 2022-03-29 PROCEDURE — 85025 COMPLETE CBC W/AUTO DIFF WBC: CPT

## 2022-03-29 PROCEDURE — 80053 COMPREHEN METABOLIC PANEL: CPT

## 2022-03-29 PROCEDURE — 99285 EMERGENCY DEPT VISIT HI MDM: CPT | Mod: 25

## 2022-03-29 PROCEDURE — 83036 HEMOGLOBIN GLYCOSYLATED A1C: CPT

## 2022-03-29 PROCEDURE — 80061 LIPID PANEL: CPT

## 2022-03-29 RX ORDER — BICTEGRAVIR SODIUM, EMTRICITABINE, AND TENOFOVIR ALAFENAMIDE FUMARATE 30; 120; 15 MG/1; MG/1; MG/1
1 TABLET ORAL
Qty: 0 | Refills: 0 | DISCHARGE
Start: 2022-03-29

## 2022-03-29 RX ORDER — BICTEGRAVIR SODIUM, EMTRICITABINE, AND TENOFOVIR ALAFENAMIDE FUMARATE 30; 120; 15 MG/1; MG/1; MG/1
1 TABLET ORAL
Qty: 30 | Refills: 0
Start: 2022-03-29 | End: 2022-04-27

## 2022-03-29 RX ORDER — SERTRALINE 25 MG/1
1 TABLET, FILM COATED ORAL
Qty: 30 | Refills: 0
Start: 2022-03-29 | End: 2022-04-27

## 2022-03-29 RX ORDER — INFLUENZA VIRUS VACCINE 15; 15; 15; 15 UG/.5ML; UG/.5ML; UG/.5ML; UG/.5ML
0.5 SUSPENSION INTRAMUSCULAR ONCE
Refills: 0 | Status: DISCONTINUED | OUTPATIENT
Start: 2022-03-29 | End: 2022-03-29

## 2022-03-29 RX ORDER — ARIPIPRAZOLE 15 MG/1
1 TABLET ORAL
Qty: 30 | Refills: 0
Start: 2022-03-29 | End: 2022-04-27

## 2022-03-29 RX ORDER — GABAPENTIN 400 MG/1
1 CAPSULE ORAL
Qty: 60 | Refills: 0
Start: 2022-03-29 | End: 2022-04-27

## 2022-03-29 RX ADMIN — ARIPIPRAZOLE 5 MILLIGRAM(S): 15 TABLET ORAL at 10:40

## 2022-03-29 RX ADMIN — GABAPENTIN 300 MILLIGRAM(S): 400 CAPSULE ORAL at 10:41

## 2022-03-29 RX ADMIN — SERTRALINE 50 MILLIGRAM(S): 25 TABLET, FILM COATED ORAL at 10:40

## 2022-03-29 RX ADMIN — BICTEGRAVIR SODIUM, EMTRICITABINE, AND TENOFOVIR ALAFENAMIDE FUMARATE 1 TABLET(S): 30; 120; 15 TABLET ORAL at 10:41

## 2022-03-29 NOTE — BH INPATIENT PSYCHIATRY DISCHARGE NOTE - OTHER PAST PSYCHIATRIC HISTORY (INCLUDE DETAILS REGARDING ONSET, COURSE OF ILLNESS, INPATIENT/OUTPATIENT TREATMENT)
Per chart review, PPH reported Bipolar vs Schizoaffective Disorder, history of prior inpatient psychiatric admissions in [place] most recently three years ago, not currently in outpatient psychiatric treatment (last in treatment approximately six months ago).

## 2022-03-29 NOTE — BH PSYCHOLOGY - CLINICIAN PSYCHOTHERAPY NOTE - NSBHPSYCHOLNARRATIVE_PSY_A_CORE FT
Ms. Wetzel seen 1:1 to do a CAMS as well as prepare for her discharge today. Ms. Wetzel identified triggers related to grief and coping strategies and denies any suicidality.    MSE: oriented x3, cooperative, linear, normal speech, denies SI Ms. Rhoadesds seen individually to do a CAMS as well as prepare for her discharge today. Ms. Barnett identified triggers for dysregulation related to grief, and coping strategies and denies any suicidality.    MSE: oriented x3, cooperative, linear, normal speech, denies SI

## 2022-03-29 NOTE — BH DISCHARGE NOTE NURSING/SOCIAL WORK/PSYCH REHAB - NSDCPRGOAL_PSY_ALL_CORE
Pt has been mostly isolative during this admission although observed in the milieu to get needs met. Pt declined participation in all offered groups and kept to self. Pt has been mostly pleasant on approach but irritable at times, demonstrating poor frustration tolerance on select occasions. Pt has been able to maintain behavioral control while on the unit and voice needs to staff members. Pt completed a safety plan and received a copy to take with her.

## 2022-03-29 NOTE — BH INPATIENT PSYCHIATRY DISCHARGE NOTE - CASE SUMMARY
Pt eager for discharge. Mood improved. Said she did not want inpatient rehab or substance abuse treatment.     MSE- Well related and groomed, good EC. -PMR/A Speech: wnl Mood: "Better." Affect" full-range, appropriate. TP: Linear TC: -SI/HI/AH/VH/PI. I&J: fair

## 2022-03-29 NOTE — BH INPATIENT PSYCHIATRY DISCHARGE NOTE - NSDCMRMEDTOKEN_GEN_ALL_CORE_FT
Abilify 5 mg oral tablet: 1 tab(s) orally once a day   bictegravir/emtricitabine/tenofovir 50 mg-200 mg-25 mg oral tablet: 1 tab(s) orally once a day  gabapentin 300 mg oral capsule: 1 cap(s) orally 2 times a day   sertraline 50 mg oral tablet: 1 tab(s) orally once a day    Abilify 5 mg oral tablet: 1 tab(s) orally once a day   bictegravir/emtricitabine/tenofovir 50 mg-200 mg-25 mg oral tablet: 1 tab(s) orally once a day  Biktarvy 50 mg-200 mg-25 mg oral tablet: 1 tab(s) orally once a day   gabapentin 300 mg oral capsule: 1 cap(s) orally 2 times a day   sertraline 50 mg oral tablet: 1 tab(s) orally once a day
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
## 2022-03-29 NOTE — BH DISCHARGE NOTE NURSING/SOCIAL WORK/PSYCH REHAB - NSDPDISTO_PSY_ALL_CORE
The patient will return to his aunt's residence at: [address] [place] [id number]./Home The patient will return to her aunt's residence at: [address] [place] [address], [address] [id number]./Home

## 2022-03-29 NOTE — BH INPATIENT PSYCHIATRY DISCHARGE NOTE - NSDCCPCAREPLAN_GEN_ALL_CORE_FT
PRINCIPAL DISCHARGE DIAGNOSIS  Diagnosis: Substance induced mood disorder  Assessment and Plan of Treatment:       SECONDARY DISCHARGE DIAGNOSES  Diagnosis: Unspecified mood [affective] disorder  Assessment and Plan of Treatment:

## 2022-03-29 NOTE — BH INPATIENT PSYCHIATRY DISCHARGE NOTE - NSBHMETABOLIC_PSY_ALL_CORE_FT
BMI: BMI (kg/m2): 19.3 (03-27-22 @ 01:48)  HbA1c: A1C with Estimated Average Glucose Result: 4.8 % (03-29-22 @ 07:55)    Glucose:   BP: 134/90 (03-28-22 @ 17:00) (132/93 - 162/91)  Lipid Panel: Date/Time: 03-29-22 @ 07:55  Cholesterol, Serum: 135  Direct LDL: --  HDL Cholesterol, Serum: 41  Total Cholesterol/HDL Ration Measurement: --  Triglycerides, Serum: 57
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
## 2022-03-29 NOTE — BH DISCHARGE NOTE NURSING/SOCIAL WORK/PSYCH REHAB - NSCDUDCCRISIS_PSY_A_CORE
[facility]  [phone]) [facility] ([phone])  Text "WELL" to 50350  Website: www.[employer]/.National Suicide Prevention Lifeline 1 (701) 379-6989/.  [facility]  1 (858) LIFENET (762-4421)/.  [facility]  [address] [place] [address] [phone]   Hours:  Monday through Friday from 9 AM to 3 PM [facility]  1 [phone]) [facility] ([phone])  Text "WELL" to 48621  Website: www.&[employer]/.[employer] 1 (662) 321-ZJKR (2589) Website: www.safehorizon.org/.National Suicide Prevention Lifeline 0 (087) 130-7171/.  [facility]  1 (456) LIFENET (595-9711)/.  [facility]  [address] [place] [address] [phone]   Hours:  Monday through Friday from 9 AM to 3 PM/.  [facility] [facility] Crisis Line  [phone], Option 1

## 2022-03-29 NOTE — BH INPATIENT PSYCHIATRY DISCHARGE NOTE - HOSPITAL COURSE
Per initial ED evaluation (3/26), pt was agitated, labile on encounter, yelled at staff, was not verbally redirectable and required Zyprexa 5mg PO for agitation. Pt then slept.    3/27: Initially on 8Uris, pt presented as anxious but calmer, "cooperative, well-related, demonstrating good behavioral control and linear thought processes." She admitted that she had been very close to her mother and that mom took care of her medications as well as her psychiatric and medical treatment. Since mom's death, the patient had discontinued both psychiatric and medical treatment, then experienced worsening SI with a plan to cut both wrists over the three months preceding admission. Apparently the patient "had a razor and everything" and had planned on killing herself until he realized that his mother would not approve of such an action.  Pt was started on Abilify 5mg daily for psychosis/mood, sertraline 50mg daily for mood/anxiety, olanzapine 5mg q6h for agitation, and restarted on Biktarvy 50/200/25mg daily for HIV.    3/28: Pt with complaints of HA, tooth pain. Denying symptoms. Wants discharge. Will observe for 24 hours before making decision. If this is a cocaine presentation, pt may be safe for discharge.    3/29: Pt remains calm, no complaints of SI, well-related, pleasant. Still wishes to leave. Because she remains stable, the team feels safe discharging her back into the community. The patient will be discharged with Abilify 5mg daily (30 pills x 30 days), sertraline 50mg daily (30 pills x 30 days), Biktarvy 50/200/25mg daily (30 pills x 30 days), gabapentin 300mg BID (60 pills x 30 days). Pt states she plans to return to aunt's home on [address] before returning to [place].
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
## 2022-03-29 NOTE — BH SAFETY PLAN - WARNING SIGN 1
Ms. Wetzel collaborated on the creation of a safety plan and received a copy of it. Copy of SP can be found in chart.
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
## 2022-03-29 NOTE — BH DISCHARGE NOTE NURSING/SOCIAL WORK/PSYCH REHAB - PATIENT PORTAL LINK FT
You can access the FollowMyHealth Patient Portal offered by [facility] by registering at the following website: [email or web address]. By joining [employer]’s FollowMyHealth portal, you will also be able to view your health information using other applications (apps) compatible with our system.

## 2022-03-29 NOTE — BH INPATIENT PSYCHIATRY DISCHARGE NOTE - NSBHSUICIDESTATUS_PSY_ALL_CORE
Pt currently denying suicidal ideation. States will remain adherent to medications and to treatment (in [place]). Her in [place], pt has familial support (her aunt). Also states she has family in [place].

## 2022-03-29 NOTE — BH DISCHARGE NOTE NURSING/SOCIAL WORK/PSYCH REHAB - NSBHDCADDR1FT_A_CORE
[address] [place] [address], [place] [address] (Between [facility] & [facility] [place])

## 2022-03-29 NOTE — BH DISCHARGE NOTE NURSING/SOCIAL WORK/PSYCH REHAB - NSDCADDINFO1FT_PSY_ALL_CORE
You are scheduled to attend a walk-in mental health appointment at [facility] on WEDNESDAY, MARCH 30TH, 2022. Please arrive between 7:30AM-10:30AM in order to be seen by a provider (it is recommended you arrive at 7:30AM). If you have questions, you can call the clinic at: [phone].

## 2022-04-20 ENCOUNTER — EMERGENCY (EMERGENCY)
Facility: HOSPITAL | Age: 42
LOS: 1 days | Discharge: HOME | End: 2022-04-20
Attending: EMERGENCY MEDICINE | Admitting: EMERGENCY MEDICINE
Payer: MEDICAID

## 2022-04-20 VITALS
TEMPERATURE: 99 F | RESPIRATION RATE: 18 BRPM | WEIGHT: 184.97 LBS | DIASTOLIC BLOOD PRESSURE: 92 MMHG | SYSTOLIC BLOOD PRESSURE: 154 MMHG | HEART RATE: 104 BPM | HEIGHT: 73 IN | OXYGEN SATURATION: 96 %

## 2022-04-20 LAB — SARS-COV-2 RNA SPEC QL NAA+PROBE: SIGNIFICANT CHANGE UP

## 2022-04-20 PROCEDURE — 99285 EMERGENCY DEPT VISIT HI MDM: CPT | Mod: 25

## 2022-04-20 PROCEDURE — 93010 ELECTROCARDIOGRAM REPORT: CPT

## 2022-04-20 PROCEDURE — 71046 X-RAY EXAM CHEST 2 VIEWS: CPT | Mod: 26

## 2022-04-20 RX ORDER — ACETAMINOPHEN 500 MG
975 TABLET ORAL ONCE
Refills: 0 | Status: COMPLETED | OUTPATIENT
Start: 2022-04-20 | End: 2022-04-20

## 2022-04-20 RX ORDER — SODIUM CHLORIDE 9 MG/ML
1000 INJECTION INTRAMUSCULAR; INTRAVENOUS; SUBCUTANEOUS ONCE
Refills: 0 | Status: COMPLETED | OUTPATIENT
Start: 2022-04-20 | End: 2022-04-20

## 2022-04-20 RX ORDER — OLANZAPINE 15 MG/1
10 TABLET, FILM COATED ORAL ONCE
Refills: 0 | Status: COMPLETED | OUTPATIENT
Start: 2022-04-20 | End: 2022-04-20

## 2022-04-20 RX ORDER — PSEUDOEPHEDRINE HCL 30 MG
60 TABLET ORAL ONCE
Refills: 0 | Status: COMPLETED | OUTPATIENT
Start: 2022-04-20 | End: 2022-04-20

## 2022-04-20 RX ADMIN — Medication 200 MILLIGRAM(S): at 21:31

## 2022-04-20 RX ADMIN — Medication 60 MILLIGRAM(S): at 21:31

## 2022-04-20 RX ADMIN — Medication 975 MILLIGRAM(S): at 21:31

## 2022-04-20 NOTE — ED PROVIDER NOTE - PATIENT PORTAL LINK FT
You can access the FollowMyHealth Patient Portal offered by [facility] by registering at the following website: [email or web address]. By joining [employer]’s FollowMyHealth portal, you will also be able to view your health information using other applications (apps) compatible with our system. You can access the FollowMyHealth Patient Portal offered by [facility] by registering at the following website: [email or web address]. By joining [employer]’s FollowMyHealth portal, you will also be able to view your health information using other applications (apps) compatible with our system.

## 2022-04-20 NOTE — ED PROVIDER NOTE - CLINICAL SUMMARY MEDICAL DECISION MAKING FREE TEXT BOX
patient is well appearing, nontoxic, stable vs, with mild uri symptoms, will do covid swab, basic labs, and cxr, ck a trop, low suspicion for pe, or acs. patient has substance on board, not cooperative with staff, screaming at radiology staff and security. attempted verbal deescalation. will continue to monitor.

## 2022-04-20 NOTE — ED ADULT NURSE REASSESSMENT NOTE - NS ED NURSE REASSESS COMMENT FT1
patient has been having multiple bouts of temper tantrums with kicking, screaming, thrashing about in stretcher, trying to hit herself and throwing possessions on the floor; took pants off and refusing to get dressed again; found to be crawling on the ED floor towards the exit; laying on the ground crying and screaming at security guards; placed back in wheelchair and taken back to original room; paper scrub pants provided and put back on patient; already given medicaiton as per MD order; holding off on sedation medciation at this time; talked to patient and told to calm down in room and maybe try to take a nap; patient in agreement; will continue to monitor; ok to not place an IV or draw blood at this time as per MD Ruano; will hold off on those orders

## 2022-04-20 NOTE — ED PROVIDER NOTE - OBJECTIVE STATEMENT
40 yo m to f, hx HIV not on medication, hx of psychiatric disease, substance abuse, unable to elaborate on hpi, presents with non productive cough for 2 weeks, associated with runny nose, nasal congestion, and over all fatigue. notes some diarrhea in the past few days. denies vomiting, cp, sob, syncope. denies rash. smoker. denies falls or trauma.

## 2022-04-20 NOTE — ED ADULT NURSE NOTE - OBJECTIVE STATEMENT
BIBA c/o chest pain x3 months; was slumping over in triage wheelchair with EMS; c/o sore throat, runny nose and cough/chest pain; now changing time line to 3 weeks; denying drug use today but have temper tantrums, kicking, screaming, thoughts 'she is going to die from this coughing'; tossing and turning in stretcher and trying to move the stretcher

## 2022-04-20 NOTE — ED PROVIDER NOTE - PROGRESS NOTE DETAILS
patient declining lab draw, becoming increasingly verbally aggressive with staff, taking off clothing, refusing to get dressed, patient discharged, does not want labs, will dc. resting comfortably declined labs.

## 2022-04-20 NOTE — ED ADULT NURSE REASSESSMENT NOTE - NS ED NURSE REASSESS COMMENT FT1
Patient is laying in bed, as per previous RN, no labs were able to be drawn and MD is aware; ok with no labwork at this time. Awaiting further disposition. All known needs met.

## 2022-04-21 PROBLEM — B20 HUMAN IMMUNODEFICIENCY VIRUS [HIV] DISEASE: Chronic | Status: ACTIVE | Noted: 2022-03-26

## 2022-04-21 PROBLEM — F31.9 BIPOLAR DISORDER, UNSPECIFIED: Chronic | Status: ACTIVE | Noted: 2022-03-26

## 2022-04-21 PROBLEM — F25.9 SCHIZOAFFECTIVE DISORDER, UNSPECIFIED: Chronic | Status: ACTIVE | Noted: 2022-03-26

## 2022-04-21 NOTE — ED ADULT NURSE REASSESSMENT NOTE - NS ED NURSE REASSESS COMMENT FT1
Pt verbally and physically aggressive toward staff upon discharge. Pt threatening to kill staff and attempted to throw personal items at staff members. Security and [employer] officers escorted out of ED. Pt ambulatory w/ steady gait. Pt verbally and physically aggressive toward staff upon discharge. Pt threatening to hit staff and attempted to throw personal items at staff members. Security and [employer] officers escorted out of ED. Pt ambulatory w/ steady gait.

## 2022-04-21 NOTE — ED ADULT NURSE REASSESSMENT NOTE - NS ED NURSE REASSESS COMMENT FT1
Pt refusing to leave ED after discharge. Pt offered food, clothes, shelter list and metro card. Pt states "You're a fucking bitch, leave me alone. Just let me sleep you cunt". Security and [employer] aware. Pt refusing to leave ED after discharge. Pt offered food, clothes, shelter list and metro card. Pt states "You're a fucking bitch, leave me alone. Just let me sleep you cunt". Security and [facility] officers aware.

## 2022-04-23 DIAGNOSIS — Z21 ASYMPTOMATIC HUMAN IMMUNODEFICIENCY VIRUS [HIV] INFECTION STATUS: ICD-10-CM

## 2022-04-23 DIAGNOSIS — F17.200 NICOTINE DEPENDENCE, UNSPECIFIED, UNCOMPLICATED: ICD-10-CM

## 2022-04-23 DIAGNOSIS — Z20.822 CONTACT WITH AND (SUSPECTED) EXPOSURE TO COVID-19: ICD-10-CM

## 2022-04-23 DIAGNOSIS — J06.9 ACUTE UPPER RESPIRATORY INFECTION, UNSPECIFIED: ICD-10-CM

## 2022-04-23 DIAGNOSIS — Z91.013 ALLERGY TO SEAFOOD: ICD-10-CM

## 2022-04-23 DIAGNOSIS — R05.2 SUBACUTE COUGH: ICD-10-CM
